# Patient Record
Sex: FEMALE | Race: WHITE
[De-identification: names, ages, dates, MRNs, and addresses within clinical notes are randomized per-mention and may not be internally consistent; named-entity substitution may affect disease eponyms.]

---

## 2019-02-14 ENCOUNTER — HOSPITAL ENCOUNTER (EMERGENCY)
Dept: HOSPITAL 14 - H.ER | Age: 44
Discharge: HOME | End: 2019-02-14
Payer: COMMERCIAL

## 2019-02-14 VITALS — BODY MASS INDEX: 25 KG/M2

## 2019-02-14 VITALS
SYSTOLIC BLOOD PRESSURE: 118 MMHG | DIASTOLIC BLOOD PRESSURE: 74 MMHG | OXYGEN SATURATION: 100 % | RESPIRATION RATE: 18 BRPM | TEMPERATURE: 97.5 F | HEART RATE: 70 BPM

## 2019-02-14 DIAGNOSIS — L40.9: Primary | ICD-10-CM

## 2019-02-14 NOTE — ED PDOC
HPI:Nausea, Vomiting, Diarrhea


Time Seen by Provider: 02/14/19 11:00


Chief Complaint (Nursing): Abnormal Skin Integrity


Chief Complaint (Provider): RASH


History Per: Patient (42 Y/O FEMALE HERE WITH RASH GENERALIZED INTERMITTENT NOT 

ASSOCIATED WITH MEDICATIONS.  NO FEVERS/CHILLS. NO VOMITING. NO NEW LOTIONS.  

TAKES FLEXERIL/NAPROXEN INTERMITTENTLY.)





Past Medical History


Reviewed: Historical Data, Nursing Documentation, Vital Signs


Vital Signs: 





                                Last Vital Signs











Temp  97.5 F L  02/14/19 10:43


 


Pulse  70   02/14/19 10:43


 


Resp  18   02/14/19 10:43


 


BP  118/74   02/14/19 10:43


 


Pulse Ox  100   02/14/19 10:43














- Family History


Family History: States: No Known Family Hx





- Allergies


Allergies/Adverse Reactions: 


                                    Allergies











Allergy/AdvReac Type Severity Reaction Status Date / Time


 


No Known Allergies Allergy   Verified 02/14/19 12:14














Review of Systems


ROS Statement: Except As Marked, All Systems Reviewed And Found Negative


Skin: Positive for: Rash





Physical Exam





- Reviewed


Nursing Documentation Reviewed: Yes


Vital Signs Reviewed: Yes





- Physical Exam


Appears: Positive for: Well, Non-toxic, No Acute Distress


Head Exam: Positive for: ATRAUMATIC, NORMAL INSPECTION, NORMOCEPHALIC


Skin: Positive for: Normal Color, Warm, Rash (PATCHY RASH TORSO FRONT/BACK WITH 

SCALY SKIN.)


Eye Exam: Positive for: EOMI, Normal appearance, PERRL


ENT: Positive for: Normal ENT Inspection


Neck: Positive for: Normal, Painless ROM


Cardiovascular/Chest: Positive for: Regular Rate, Rhythm


Respiratory: Positive for: CNT, Normal Breath Sounds


Gastrointestinal/Abdominal: Positive for: Normal Exam, Soft


Back: Positive for: Normal Inspection


Extremity: Positive for: Normal ROM


Neurologic/Psych: Positive for: Alert, Oriented





- ECG


O2 Sat by Pulse Oximetry: 100





Disposition





- Clinical Impression


Clinical Impression: 


 Psoriasis








- Patient ED Disposition


Is Patient to be Admitted: No





- Disposition


Referrals: 


Ady Tate MD [Staff Provider] - 


Disposition: Routine/Home


Disposition Time: 12:19


Condition: FAIR


Additional Instructions: 





DERMATOLOGY





GEOFF BURDEN [phone]





Instructions:  Psoriasis (DC)


Print Language: Vincentian